# Patient Record
Sex: FEMALE | Race: WHITE | NOT HISPANIC OR LATINO | Employment: OTHER | URBAN - METROPOLITAN AREA
[De-identification: names, ages, dates, MRNs, and addresses within clinical notes are randomized per-mention and may not be internally consistent; named-entity substitution may affect disease eponyms.]

---

## 2019-06-12 ENCOUNTER — APPOINTMENT (OUTPATIENT)
Dept: RADIOLOGY | Facility: MEDICAL CENTER | Age: 69
End: 2019-06-12
Attending: EMERGENCY MEDICINE
Payer: MEDICARE

## 2019-06-12 ENCOUNTER — HOSPITAL ENCOUNTER (EMERGENCY)
Facility: MEDICAL CENTER | Age: 69
End: 2019-06-12
Attending: EMERGENCY MEDICINE
Payer: MEDICARE

## 2019-06-12 VITALS
HEIGHT: 68 IN | SYSTOLIC BLOOD PRESSURE: 165 MMHG | DIASTOLIC BLOOD PRESSURE: 98 MMHG | RESPIRATION RATE: 18 BRPM | WEIGHT: 215.39 LBS | HEART RATE: 85 BPM | OXYGEN SATURATION: 92 % | TEMPERATURE: 98.3 F | BODY MASS INDEX: 32.64 KG/M2

## 2019-06-12 DIAGNOSIS — S00.83XA FACIAL CONTUSION, INITIAL ENCOUNTER: ICD-10-CM
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
## 2019-06-12 DIAGNOSIS — S16.1XXA STRAIN OF NECK MUSCLE, INITIAL ENCOUNTER: ICD-10-CM

## 2019-06-12 DIAGNOSIS — S00.81XA ABRASION OF FACE, INITIAL ENCOUNTER: ICD-10-CM

## 2019-06-12 DIAGNOSIS — S00.83XA CONTUSION OF FACE, INITIAL ENCOUNTER: ICD-10-CM

## 2019-06-12 PROCEDURE — 70486 CT MAXILLOFACIAL W/O DYE: CPT

## 2019-06-12 PROCEDURE — 72125 CT NECK SPINE W/O DYE: CPT

## 2019-06-12 PROCEDURE — 700111 HCHG RX REV CODE 636 W/ 250 OVERRIDE (IP): Performed by: EMERGENCY MEDICINE

## 2019-06-12 PROCEDURE — 90471 IMMUNIZATION ADMIN: CPT

## 2019-06-12 PROCEDURE — 99283 EMERGENCY DEPT VISIT LOW MDM: CPT

## 2019-06-12 PROCEDURE — 90715 TDAP VACCINE 7 YRS/> IM: CPT | Performed by: EMERGENCY MEDICINE

## 2019-06-12 RX ADMIN — CLOSTRIDIUM TETANI TOXOID ANTIGEN (FORMALDEHYDE INACTIVATED), CORYNEBACTERIUM DIPHTHERIAE TOXOID ANTIGEN (FORMALDEHYDE INACTIVATED), BORDETELLA PERTUSSIS TOXOID ANTIGEN (GLUTARALDEHYDE INACTIVATED), BORDETELLA PERTUSSIS FILAMENTOUS HEMAGGLUTININ ANTIGEN (FORMALDEHYDE INACTIVATED), BORDETELLA PERTUSSIS PERTACTIN ANTIGEN, AND BORDETELLA PERTUSSIS FIMBRIAE 2/3 ANTIGEN 0.5 ML: 5; 2; 2.5; 5; 3; 5 INJECTION, SUSPENSION INTRAMUSCULAR at 15:55

## 2019-06-12 NOTE — ED TRIAGE NOTES
"Chief Complaint   Patient presents with   • T-5000 GLF     Pt was working in her garden, stepped on the garbage can lid and fell forward landing on her face. Multiple abrasions noted to face. Pt denies -LOC. Denies blood thinners.   BP (!) 181/101   Pulse 98   Temp 36.8 °C (98.3 °F) (Temporal)   Resp 15   Ht 1.727 m (5' 8\")   Wt 97.7 kg (215 lb 6.2 oz)   SpO2 92%   Pt informed of wait times. Educated on triage process.  Asked to return to triage RN for any new or worsening of symptoms. Thanked for patience.        "

## 2019-06-12 NOTE — DISCHARGE INSTRUCTIONS
Keep the wounds on the face clean and dry.  Wash with soap and water once to twice daily.  Return for any signs of infection.  Your scans at this time are negative use Motrin and Tylenol for discomfort.  Please see her primary care doctor for further evaluation of your blood pressure is mildly elevated today.

## 2019-06-12 NOTE — ED PROVIDER NOTES
"ED Provider Note  ED Provider    Means of arrival: Private car  History obtained from: Patient  History limited by: None    CHIEF COMPLAINT  Chief Complaint   Patient presents with   • T-5000 GLF       HPI  Geovanna Benavides is a 69 y.o. female who presents complaints of pain in the head face and neck after a fall.  She was doing gardening work, she put her foot on a wheeled bucket, the bucket went forward and she fell forward hitting her face in the door.  No loss of consciousness.  She does have abrasions to the face.  She complains of diffuse global head pain, and neck pain.  She is had no numbness tingling or weakness in the arm no pains in the extremities chest or abdomen.    No nausea no vomiting    REVIEW OF SYSTEMS  See HPI for further details.     PAST MEDICAL HISTORY   has a past medical history of ADD (attention deficit disorder); ASTHMA; and Depression.    SOCIAL HISTORY  Social History     Social History Main Topics   • Smoking status: Never Smoker   • Smokeless tobacco: Not on file   • Alcohol use No   • Drug use: No   • Sexual activity: Not on file       SURGICAL HISTORY  patient denies any surgical history    CURRENT MEDICATIONS  Home Medications     Reviewed by Poly Esquivel R.N. (Registered Nurse) on 06/12/19 at 1402  Med List Status: Partial   Medication Last Dose Status   CITALOPRAM HYDROBROMIDE PO  Active   EFFEXOR PO  Active   FLOVENT INH  Active   PROAIR HFA INH  Active   RITALIN PO  Active   WELLBUTRIN PO  Active                ALLERGIES  No Known Allergies    PHYSICAL EXAM  VITAL SIGNS: BP (!) 181/101   Pulse 98   Temp 36.8 °C (98.3 °F) (Temporal)   Resp 15   Ht 1.727 m (5' 8\")   Wt 97.7 kg (215 lb 6.2 oz)   SpO2 92%   BMI 32.75 kg/m²   Constitutional: Alert in no apparent distress.  HENT: Normocephalic, , Mucous membranes are moist abrasion to forehead, chin, and nose in the paranasal area  Eyes:  Conjunctiva normal, non-icteric.   Heart: no peripheral cyanosis  Lungs: respirations " are even and unlabored  Skin: Warm, Dry,normal color  Neurologic: Alert, Grossly non-focal.  Moving all extremities spontaneously  Psychiatric: Affect normal, Judgment normal, Mood normal, Appears appropriate and not intoxicated.   Neck has mild diffuse tenderness, no bony point tenderness  MEDICAL DECISION MAKING  This is a 69 y.o. female who presents ground-level fall, she was awake through the whole thing did not lose consciousness, she has had no nausea suspect brain injury.  She does have pain in the face, head and neck.  CT be done for evaluation for fractures.  Wound care will be initiated.    DIAGNOSTIC STUDIES / PROCEDURES    LABS  No results found for this or any previous visit.      RADIOLOGY  CT-MAXILLOFACIAL W/O   Final Result      Negative for facial or orbital fracture      CT-CSPINE WITHOUT PLUS RECONS   Final Result         Negative for cervical spine fracture or subluxation          COURSE  Pertinent Labs & Imaging studies reviewed. (See chart for details)    3:12 PM - Patient seen and examined at bedside. Discussed plan of care,   Patient abrasions were cleaned, the patient's CT shows no fracture of the neck or face.  Tetanus prophylaxis been initiated.  She does have abrasions, worsens in the right para nose area there is no suturable laceration.    FINAL IMPRESSION  1. Fall, initial encounter    2. Abrasion of face, initial encounter    3. Contusion of face, initial encounter    4. Facial contusion, initial encounter    5. Strain of neck muscle, initial encounter

## 2021-03-03 DIAGNOSIS — Z23 NEED FOR VACCINATION: ICD-10-CM
